# Patient Record
(demographics unavailable — no encounter records)

---

## 2025-04-10 NOTE — REASON FOR VISIT
[Consultation] : a consultation visit [Patient] : patient [Father] : father [Medical Records] : medical records [Time Spent: ____ minutes] : Total time spent using  services: [unfilled] minutes. The patient's primary language is not English thus required  services. [Interpreters_IDNumber] : [id number] [Interpreters_FullName] : Shanti [TWNoteComboBox1] : Burmese

## 2025-04-10 NOTE — PAST MEDICAL HISTORY
[At Term] : at term [Normal Vaginal Route] : by normal vaginal route [None] : there were no delivery complications [Speech Delay w/ Normal Development] : patient has speech delay with normal development [Speech Therapy] : speech therapy [Age Appropriate] : age appropriate developmental milestones not met [FreeTextEntry1] : normal [FreeTextEntry4] : NICU for phototherapy 3-4 days [FreeTextEntry5] : 5th grade

## 2025-04-10 NOTE — PAST MEDICAL HISTORY
Medication: Januvia 100 mg   Last office visit date: 03/19/2024  Medication Refill Protocol Failed.  Protocol approved due to: data identified in chart review.     DPP-4 Inhibitors Gliptins Antihyperglycemics Refill Protocol - 12 Month Protocol [id number]/04/2024 01:45 PM   Protocol Details Hgb A1c less than 8 within last 6 months looking at last value -- IF CRITERIA FAILED REFER TO PROTOCOL DETAILS     ASSESSMENT: Type 2 diabetes mellitus with hyperglycemia, without long-term current use of insulin (CMD)  (primary encounter diagnosis)  Plan: Comprehensive Metabolic Panel, Glycohemoglobin  Continue Januvia  Patient declines GLP-1 at this time  She is going to keep working on reducing candy and soda    30 day refill sent in as patient is scheduled to establish care with new PCP on 09/20/2024    [At Term] : at term [Normal Vaginal Route] : by normal vaginal route [None] : there were no delivery complications [Speech Delay w/ Normal Development] : patient has speech delay with normal development [Speech Therapy] : speech therapy [Age Appropriate] : age appropriate developmental milestones not met [FreeTextEntry1] : normal [FreeTextEntry4] : NICU for phototherapy 3-4 days [FreeTextEntry5] : 5th grade

## 2025-04-10 NOTE — ASSESSMENT
[FreeTextEntry1] :  Genesis is an 11-year 1 month old girl, referred for evaluation of elevated A1C. She is at the 68th percentile for height and 96th percentile for BMI. Her fasting blood sugar today is 84 mg/dL. I discussed with Genesis and her father and explained the ranges of A1C for prediabetes and diabetes. I explained her level is borderline prediabetes and this cannot explain her polyuria and polydipsia. She reports she did not eat or drink today. I ordered a urine test for osmolarity and urinalysis, and blood work to evaluate her fasting insulin, and repeat her liver enzymes that were high. I will call them with the results and if liver enzymes are elevated, I will refer to GI.    I recommended they will meet the nutritionist and improve her diet. I recommended to discontinue the sugary beverages consumption and to increase the amount of physical activity. Family asked to follow in [place] as they leave there and does not have a car. I explained I do not know if Dr. Segura is available in 3 months there, but they can make an appointment for 6 months in case they will need it and see me here in 3 months.

## 2025-04-10 NOTE — HISTORY OF PRESENT ILLNESS
[Polyuria] : polyuria [Polydipsia] : polydipsia [Premenarchal] : premenarchal [Headaches] : no headaches [Constipation] : no constipation [Cold Intolerance] : no cold intolerance [Heat Intolerance] : no heat intolerance [Fatigue] : no fatigue [Weakness] : no weakness [Abdominal Pain] : no abdominal pain [FreeTextEntry2] : Genesis is an 11-year 1 month old girl, referred for evaluation of elevated A1C. Review of her growth chat shows stable growth around the 70th-80th percentile for height. Her BMI was around the 90th percentile until the age of 5 years and increased to the obesity range since.   The pediatrician completed blood work that was done on 3/8/2025 and showed A1C of 5.7%, elevated liver enzymes with  IU/L, AST 71 IU/L, ALKP 468 IU/L, Lipid profile:  mg/dL, HDL 42 mg/dL, LDL 65 mg/dL. normal CBC.   Her father reports she has polyuria and polydipsia in the past 2 months. She sometimes wakes up at night to drink and urinate. She has occasional headaches, and leg pain after she walks.  Her diet in a typical day includes:  breakfast: coffee, piece of bread with eggs lunch: rice+ chicken dinner: soup with chicken/beef and vegetables snacks: apples drinks- 1 cup a day of juice or soda exercise: gym in school once a week, walking outside few times a week.

## 2025-04-10 NOTE — ADDENDUM
[FreeTextEntry1] : ADD 4/9/25: I spoke to mom with the help of a  (Carmel #[id number]): I explained the A1C is normal now, the insulin level is elevated and that shows she has a risk to have higher blood sugars. I explained I checked the osmolality of the urine because she complained of frequent urination and drinking and that ruled out Diabetes insipidus. I recommended to reach out the pediatrician if that continues or if she has pain or burning during urination so she will be evaluated for a UTI. Her liver enzymes improved but still elevated and I referred her to GI. Follow up as planned.

## 2025-04-10 NOTE — PHYSICAL EXAM
[Healthy Appearing] : healthy appearing [Acanthosis Nigricans___] : acanthosis nigricans over [unfilled] [2] : was Gurdeep stage 2 [Moderate] : moderate [Gurdeep Stage ___] : the Gurdeep stage for breast development was [unfilled] [Normal Appearance] : normal appearance [Well formed] : well formed [Normal S1 and S2] : normal S1 and S2 [Clear to Ausculation Bilaterally] : clear to auscultation bilaterally [Abdomen Soft] : soft [Normal] : grossly intact [FreeTextEntry1] : adipose tissue

## 2025-04-10 NOTE — ADDENDUM
[FreeTextEntry1] : ADD 4/9/25: I spoke to mom with the help of a  (Carmel #[id number]): I explained the A1C is normal now, the insulin level is elevated and that shows she has a risk to have higher blood sugars. I explained I checked the osmolality of the urine because she complained of frequent urination and drinking and that ruled out Diabetes insipidus. I recommended to reach out the pediatrician if that continues or if she has pain or burning during urination so she will be evaluated for a UTI. Her liver enzymes improved but still elevated and I referred her to GI. Follow up as planned.

## 2025-04-10 NOTE — REASON FOR VISIT
[Consultation] : a consultation visit [Patient] : patient [Father] : father [Medical Records] : medical records [Time Spent: ____ minutes] : Total time spent using  services: [unfilled] minutes. The patient's primary language is not English thus required  services. [Interpreters_IDNumber] : [id number] [Interpreters_FullName] : Shanti [TWNoteComboBox1] : Iraqi

## 2025-04-10 NOTE — CONSULT LETTER
[Dear  ___] : Dear  [unfilled], [Consult Letter:] : I had the pleasure of evaluating your patient, [unfilled]. [Please see my note below.] : Please see my note below. [Consult Closing:] : Thank you very much for allowing me to participate in the care of this patient.  If you have any questions, please do not hesitate to contact me. [Sincerely,] : Sincerely, [FreeTextEntry3] : Chela Gamino MD [facility] Division of Pediatric Endocrinology  [facility] [place]/[facility]

## 2025-04-10 NOTE — CONSULT LETTER
[Dear  ___] : Dear  [unfilled], [Consult Letter:] : I had the pleasure of evaluating your patient, [unfilled]. [Please see my note below.] : Please see my note below. [Consult Closing:] : Thank you very much for allowing me to participate in the care of this patient.  If you have any questions, please do not hesitate to contact me. [Sincerely,] : Sincerely, [FreeTextEntry3] : Chela Gamino MD [facility] Division of Pediatric Endocrinology  [facility] at [facility]

## 2025-05-15 NOTE — HISTORY OF PRESENT ILLNESS
[FreeTextEntry1] : 11 year old female obese here for evaluation of eleated liver enzymes in 2 blood samples.   Patient was found to have elevated liver enzymes in 2025. Mother states that around 5 years of age she started gaining weight, and drastically for the past 3 years. Seen by endocrinology in April 2025 and recommended low simple sugar diet.  Mother is trying for her to eating healthy, by giving green fruits juices.   Labs 04/07/2025 AST: 56 ALT: 108 Alkphos: 532 Tbili: 0.6 03/04/2025 AST: 71 ALT; 134 Alkphos: 468 Tbili: 0.16 HBA1C: 5.7 % Not sick at the time.   She is asymptomatic. Mother denied symptoms of chronic liver disease, including jaundice, hematemesis, blood in the stools, fatigue or anorexia and acholic stools.    Medications: none. Denied any dietary supplements, herbal medicines or any other drugs.   Full term at delivery, born via vaginal delivery No problems during pregnancy for mother, particularly no cholestasis of pregnancy and no elevated liver enzymes. No bili lights needed in the nursery.   Pertinent family history: diabetes.   Fibroscan in office Probe:  M CAP: 287 (db/M),  E: 4.4 kPa

## 2025-07-07 NOTE — PHYSICAL EXAM
[Healthy Appearing] : healthy appearing [Obese] : obese [Acanthosis Nigricans___] : acanthosis nigricans over [unfilled] [2] : was Gurdeep stage 2 [Scant] : scant [Gurdeep Stage ___] : the Gurdeep stage for breast development was [unfilled] [Normal Appearance] : normal appearance [Well formed] : well formed [Normal S1 and S2] : normal S1 and S2 [Clear to Ausculation Bilaterally] : clear to auscultation bilaterally [Abdomen Soft] : soft [Normal] : grossly intact

## 2025-07-17 NOTE — ADDENDUM
[FreeTextEntry1] : ADD 7/17/25: I call dad and left a message with the help of a  (Korin #[id number]): A1C and fasting insulin improved, diabetes antibodies negative. Continue working on healthy diet and exercise.

## 2025-07-17 NOTE — ADDENDUM
[FreeTextEntry1] : ADD 7/17/25: I call dad and left a message with the help of a  (Korin #[id number]): A1C and fasting insulin improved, diabetes antibodies negative. Continue working on healthy diet and exercise.

## 2025-07-17 NOTE — ADDENDUM
[FreeTextEntry1] : ADD 7/17/25: I call dad and left a message with the help of a  (Korin #[id number]): A1C and fasting insulin improved, diabetes antibodies negative. Continue working on healthy diet and exercise.

## 2025-07-17 NOTE — REASON FOR VISIT
[Follow-Up: _____] : a [unfilled] follow-up visit  [Patient] : patient [Father] : father [Medical Records] : medical records [Time Spent: ____ minutes] : Total time spent using  services: [unfilled] minutes. The patient's primary language is not English thus required  services. [Interpreters_IDNumber] : [id number] [Interpreters_FullName] : Chema London [TWNoteComboBox1] : Citizen of [place]

## 2025-07-17 NOTE — REASON FOR VISIT
[Follow-Up: _____] : a [unfilled] follow-up visit  [Patient] : patient [Father] : father [Medical Records] : medical records [Time Spent: ____ minutes] : Total time spent using  services: [unfilled] minutes. The patient's primary language is not English thus required  services. [Interpreters_IDNumber] : [id number] [Interpreters_FullName] : Chema London [TWNoteComboBox1] : Gambian

## 2025-07-17 NOTE — CONSULT LETTER
[Dear  ___] : Dear  [unfilled], [Courtesy Letter:] : I had the pleasure of seeing your patient, [unfilled], in my office today. [Please see my note below.] : Please see my note below. [Consult Closing:] : Thank you very much for allowing me to participate in the care of this patient.  If you have any questions, please do not hesitate to contact me. [Sincerely,] : Sincerely, [FreeTextEntry3] : Chela Gamino MD [facility] Division of Pediatric Endocrinology  [facility]

## 2025-07-17 NOTE — CONSULT LETTER
[Dear  ___] : Dear  [unfilled], [Courtesy Letter:] : I had the pleasure of seeing your patient, [unfilled], in my office today. [Please see my note below.] : Please see my note below. [Consult Closing:] : Thank you very much for allowing me to participate in the care of this patient.  If you have any questions, please do not hesitate to contact me. [Sincerely,] : Sincerely, [FreeTextEntry3] : Chela Gamino MD [facility] Division of Pediatric Endocrinology  [facility] [place] [facility]

## 2025-07-17 NOTE — ASSESSMENT
[FreeTextEntry1] : Genesis is an 11-year 4-month-old girl, follows here for elevated A1C and obesity. Today she is at the 68th percentile for height and 95th percentile for BMI (was 96%). She gained less than 2 pounds. Her father reports her diet is much better. I explained that since her A1C in May was higher, I would like to complete fasting blood work include diabetes antibodies. I will also repeat liver enzymes and lipid panel. I will call them with the results. I recommended to make an appointment with the nutritionist. Follow up in 3 months.

## 2025-07-17 NOTE — REASON FOR VISIT
[Follow-Up: _____] : a [unfilled] follow-up visit  [Patient] : patient [Father] : father [Medical Records] : medical records [Time Spent: ____ minutes] : Total time spent using  services: [unfilled] minutes. The patient's primary language is not English thus required  services. [Interpreters_IDNumber] : [id number] [Interpreters_FullName] : Chema London [TWNoteComboBox1] : Kosovan

## 2025-07-17 NOTE — HISTORY OF PRESENT ILLNESS
[Premenarchal] : premenarchal [Polyuria] : no polyuria [Polydipsia] : no polydipsia [Constipation] : no constipation [Cold Intolerance] : no cold intolerance [Fatigue] : no fatigue [Weakness] : no weakness [Anorexia] : no anorexia [Abdominal Pain] : no abdominal pain [FreeTextEntry2] :  Genesis is an 11-year 4 month old girl, follows here for elevated A1C and obesity.  She was initially seen in 4/2024. Review of her growth chat showed stable growth around the 70th-80th percentile for height. Her BMI was around the 90th percentile until the age of 5 years and increased to the obesity range since. The pediatrician completed blood work that was done on 3/8/2025 and showed A1C of 5.7%, elevated liver enzymes with  IU/L, AST 71 IU/L, ALKP 468 IU/L, Lipid profile:  mg/dL, HDL 42 mg/dL, LDL 65 mg/dL. normal CBC. Her father reported she has polyuria and polydipsia in the past 2 months. She sometimes wakes up at night to drink and urinate. Work up was done after the visit and showed concentrated urine that rules out DI and normal A1C that rules out diabetes as causes for polyuria and polydipsia, with elevated fasting insulin level (25.1 uU/mL) that shows insulin resistance. Liver enzymes were still elevated and I referred her to GI.   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 Blood work showed A1C of 6%, liver enzymes still elevated. Lifestyle changes were recommended.   She is here today for follow up. Her father reports she is eating better, does not eat candies anymore and drinks only water. She exercise 60 minutes a day. She drinks only water.   Her diet in a typical day:  breakfast - Her mother make juice from cucumber and green apple, no added sugar, and egg lunch- salad with chicken/meat dinner - soup.

## 2025-07-17 NOTE — CONSULT LETTER
[Dear  ___] : Dear  [unfilled], [Courtesy Letter:] : I had the pleasure of seeing your patient, [unfilled], in my office today. [Please see my note below.] : Please see my note below. [Consult Closing:] : Thank you very much for allowing me to participate in the care of this patient.  If you have any questions, please do not hesitate to contact me. [Sincerely,] : Sincerely, [FreeTextEntry3] : Chela Gamino MD [facility] Division of Pediatric Endocrinology  [facility] [place]/[facility]